# Patient Record
Sex: MALE | Race: WHITE | NOT HISPANIC OR LATINO | URBAN - METROPOLITAN AREA
[De-identification: names, ages, dates, MRNs, and addresses within clinical notes are randomized per-mention and may not be internally consistent; named-entity substitution may affect disease eponyms.]

---

## 2018-06-01 ENCOUNTER — EMERGENCY (EMERGENCY)
Facility: HOSPITAL | Age: 48
LOS: 0 days | Discharge: HOME | End: 2018-06-01
Admitting: PHYSICIAN ASSISTANT

## 2018-06-01 VITALS
DIASTOLIC BLOOD PRESSURE: 81 MMHG | SYSTOLIC BLOOD PRESSURE: 139 MMHG | OXYGEN SATURATION: 96 % | RESPIRATION RATE: 18 BRPM | HEART RATE: 79 BPM

## 2018-06-01 VITALS
HEART RATE: 84 BPM | DIASTOLIC BLOOD PRESSURE: 102 MMHG | RESPIRATION RATE: 18 BRPM | SYSTOLIC BLOOD PRESSURE: 167 MMHG | TEMPERATURE: 99 F | OXYGEN SATURATION: 95 %

## 2018-06-01 DIAGNOSIS — T16.1XXA FOREIGN BODY IN RIGHT EAR, INITIAL ENCOUNTER: ICD-10-CM
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
## 2020-05-07 ENCOUNTER — INPATIENT (INPATIENT)
Facility: HOSPITAL | Age: 50
LOS: 1 days | Discharge: HOME | End: 2020-05-09
Attending: INTERNAL MEDICINE | Admitting: INTERNAL MEDICINE
Payer: COMMERCIAL

## 2020-05-07 VITALS
OXYGEN SATURATION: 96 % | HEART RATE: 107 BPM | TEMPERATURE: 98 F | RESPIRATION RATE: 22 BRPM | SYSTOLIC BLOOD PRESSURE: 182 MMHG | DIASTOLIC BLOOD PRESSURE: 102 MMHG

## 2020-05-07 LAB
ALBUMIN SERPL ELPH-MCNC: 4.7 G/DL — SIGNIFICANT CHANGE UP (ref 3.5–5.2)
ALP SERPL-CCNC: 103 U/L — SIGNIFICANT CHANGE UP (ref 30–115)
ALT FLD-CCNC: 46 U/L — HIGH (ref 0–41)
ANION GAP SERPL CALC-SCNC: 12 MMOL/L — SIGNIFICANT CHANGE UP (ref 7–14)
APPEARANCE UR: CLEAR — SIGNIFICANT CHANGE UP
AST SERPL-CCNC: 28 U/L — SIGNIFICANT CHANGE UP (ref 0–41)
BACTERIA # UR AUTO: NEGATIVE — SIGNIFICANT CHANGE UP
BASOPHILS # BLD AUTO: 0.05 K/UL — SIGNIFICANT CHANGE UP (ref 0–0.2)
BASOPHILS NFR BLD AUTO: 0.5 % — SIGNIFICANT CHANGE UP (ref 0–1)
BILIRUB SERPL-MCNC: 0.3 MG/DL — SIGNIFICANT CHANGE UP (ref 0.2–1.2)
BILIRUB UR-MCNC: NEGATIVE — SIGNIFICANT CHANGE UP
BUN SERPL-MCNC: 19 MG/DL — SIGNIFICANT CHANGE UP (ref 10–20)
CALCIUM SERPL-MCNC: 9.7 MG/DL — SIGNIFICANT CHANGE UP (ref 8.5–10.1)
CHLORIDE SERPL-SCNC: 100 MMOL/L — SIGNIFICANT CHANGE UP (ref 98–110)
CO2 SERPL-SCNC: 26 MMOL/L — SIGNIFICANT CHANGE UP (ref 17–32)
COLOR SPEC: SIGNIFICANT CHANGE UP
CREAT SERPL-MCNC: 0.8 MG/DL — SIGNIFICANT CHANGE UP (ref 0.7–1.5)
DIFF PNL FLD: NEGATIVE — SIGNIFICANT CHANGE UP
EOSINOPHIL # BLD AUTO: 0.36 K/UL — SIGNIFICANT CHANGE UP (ref 0–0.7)
EOSINOPHIL NFR BLD AUTO: 3.3 % — SIGNIFICANT CHANGE UP (ref 0–8)
EPI CELLS # UR: 0 /HPF — SIGNIFICANT CHANGE UP (ref 0–5)
GLUCOSE SERPL-MCNC: 96 MG/DL — SIGNIFICANT CHANGE UP (ref 70–99)
GLUCOSE UR QL: NEGATIVE — SIGNIFICANT CHANGE UP
HCT VFR BLD CALC: 43.7 % — SIGNIFICANT CHANGE UP (ref 42–52)
HGB BLD-MCNC: 15 G/DL — SIGNIFICANT CHANGE UP (ref 14–18)
HYALINE CASTS # UR AUTO: 0 /LPF — SIGNIFICANT CHANGE UP (ref 0–7)
IMM GRANULOCYTES NFR BLD AUTO: 0.4 % — HIGH (ref 0.1–0.3)
KETONES UR-MCNC: NEGATIVE — SIGNIFICANT CHANGE UP
LEUKOCYTE ESTERASE UR-ACNC: NEGATIVE — SIGNIFICANT CHANGE UP
LYMPHOCYTES # BLD AUTO: 2.87 K/UL — SIGNIFICANT CHANGE UP (ref 1.2–3.4)
LYMPHOCYTES # BLD AUTO: 26.6 % — SIGNIFICANT CHANGE UP (ref 20.5–51.1)
MCHC RBC-ENTMCNC: 28.6 PG — SIGNIFICANT CHANGE UP (ref 27–31)
MCHC RBC-ENTMCNC: 34.3 G/DL — SIGNIFICANT CHANGE UP (ref 32–37)
MCV RBC AUTO: 83.4 FL — SIGNIFICANT CHANGE UP (ref 80–94)
MONOCYTES # BLD AUTO: 1 K/UL — HIGH (ref 0.1–0.6)
MONOCYTES NFR BLD AUTO: 9.3 % — SIGNIFICANT CHANGE UP (ref 1.7–9.3)
NEUTROPHILS # BLD AUTO: 6.46 K/UL — SIGNIFICANT CHANGE UP (ref 1.4–6.5)
NEUTROPHILS NFR BLD AUTO: 59.9 % — SIGNIFICANT CHANGE UP (ref 42.2–75.2)
NITRITE UR-MCNC: NEGATIVE — SIGNIFICANT CHANGE UP
NRBC # BLD: 0 /100 WBCS — SIGNIFICANT CHANGE UP (ref 0–0)
NT-PROBNP SERPL-SCNC: 135 PG/ML — SIGNIFICANT CHANGE UP (ref 0–300)
PH UR: 6 — SIGNIFICANT CHANGE UP (ref 5–8)
PLATELET # BLD AUTO: 299 K/UL — SIGNIFICANT CHANGE UP (ref 130–400)
POTASSIUM SERPL-MCNC: 4 MMOL/L — SIGNIFICANT CHANGE UP (ref 3.5–5)
POTASSIUM SERPL-SCNC: 4 MMOL/L — SIGNIFICANT CHANGE UP (ref 3.5–5)
PROT SERPL-MCNC: 8 G/DL — SIGNIFICANT CHANGE UP (ref 6–8)
PROT UR-MCNC: ABNORMAL
RBC # BLD: 5.24 M/UL — SIGNIFICANT CHANGE UP (ref 4.7–6.1)
RBC # FLD: 13.7 % — SIGNIFICANT CHANGE UP (ref 11.5–14.5)
RBC CASTS # UR COMP ASSIST: 1 /HPF — SIGNIFICANT CHANGE UP (ref 0–4)
SODIUM SERPL-SCNC: 138 MMOL/L — SIGNIFICANT CHANGE UP (ref 135–146)
SP GR SPEC: 1.01 — SIGNIFICANT CHANGE UP (ref 1.01–1.02)
TROPONIN T SERPL-MCNC: <0.01 NG/ML — SIGNIFICANT CHANGE UP
UROBILINOGEN FLD QL: SIGNIFICANT CHANGE UP
WBC # BLD: 10.78 K/UL — SIGNIFICANT CHANGE UP (ref 4.8–10.8)
WBC # FLD AUTO: 10.78 K/UL — SIGNIFICANT CHANGE UP (ref 4.8–10.8)
WBC UR QL: 0 /HPF — SIGNIFICANT CHANGE UP (ref 0–5)

## 2020-05-07 PROCEDURE — 76870 US EXAM SCROTUM: CPT | Mod: 26

## 2020-05-07 PROCEDURE — 93970 EXTREMITY STUDY: CPT | Mod: 26

## 2020-05-07 PROCEDURE — 99285 EMERGENCY DEPT VISIT HI MDM: CPT

## 2020-05-07 PROCEDURE — 93010 ELECTROCARDIOGRAM REPORT: CPT

## 2020-05-07 PROCEDURE — 71045 X-RAY EXAM CHEST 1 VIEW: CPT | Mod: 26

## 2020-05-07 PROCEDURE — 99223 1ST HOSP IP/OBS HIGH 75: CPT | Mod: AI,GC

## 2020-05-07 RX ORDER — ENOXAPARIN SODIUM 100 MG/ML
40 INJECTION SUBCUTANEOUS DAILY
Refills: 0 | Status: DISCONTINUED | OUTPATIENT
Start: 2020-05-07 | End: 2020-05-09

## 2020-05-07 RX ORDER — ALBUTEROL 90 UG/1
2 AEROSOL, METERED ORAL EVERY 6 HOURS
Refills: 0 | Status: DISCONTINUED | OUTPATIENT
Start: 2020-05-07 | End: 2020-05-09

## 2020-05-07 RX ORDER — CHLORHEXIDINE GLUCONATE 213 G/1000ML
1 SOLUTION TOPICAL
Refills: 0 | Status: DISCONTINUED | OUTPATIENT
Start: 2020-05-07 | End: 2020-05-09

## 2020-05-07 RX ORDER — BUDESONIDE AND FORMOTEROL FUMARATE DIHYDRATE 160; 4.5 UG/1; UG/1
2 AEROSOL RESPIRATORY (INHALATION)
Refills: 0 | Status: DISCONTINUED | OUTPATIENT
Start: 2020-05-07 | End: 2020-05-09

## 2020-05-07 RX ORDER — FUROSEMIDE 40 MG
40 TABLET ORAL DAILY
Refills: 0 | Status: DISCONTINUED | OUTPATIENT
Start: 2020-05-08 | End: 2020-05-09

## 2020-05-07 RX ORDER — ACETAMINOPHEN 500 MG
650 TABLET ORAL EVERY 6 HOURS
Refills: 0 | Status: DISCONTINUED | OUTPATIENT
Start: 2020-05-07 | End: 2020-05-09

## 2020-05-07 RX ORDER — FUROSEMIDE 40 MG
40 TABLET ORAL ONCE
Refills: 0 | Status: COMPLETED | OUTPATIENT
Start: 2020-05-07 | End: 2020-05-07

## 2020-05-07 RX ORDER — HYDROCHLOROTHIAZIDE 25 MG
25 TABLET ORAL DAILY
Refills: 0 | Status: DISCONTINUED | OUTPATIENT
Start: 2020-05-07 | End: 2020-05-09

## 2020-05-07 RX ADMIN — Medication 40 MILLIGRAM(S): at 21:36

## 2020-05-07 NOTE — ED ADULT NURSE NOTE - OBJECTIVE STATEMENT
Pt presented to ED c/o multiple med complaints. Pt c/o B/L leg edema. Pt noted w/ +3 edema, weeping. + pulses noted. Pt also c/o scrotal swelling. Denies n/v/d/fevers/chills. Pt c/o SOB on exertion, airway intact. Pt A&Ox4, ambulatory.

## 2020-05-07 NOTE — ED PROVIDER NOTE - NS ED ROS FT
CONSTITUTIONAL: (-) fevers, (-) chills, (-) malaise, (-) diaphoresis  EYES: (-) vision changes, (-) blurry vision, (-) double vision, (-) photophobia, (-) eye pain, (-) eye redness, (-) eye discharge, (-) tearing  CARDIO: (-) chest pain, (-) palpitations, (+) b/l lower extremity edema  PULM: (-) cough, (-) sputum, (-) chest tightness, (+) shortness of breath, (-) wheezing  GI: (-) nausea, (-) vomiting, (-) diarrhea, (-) abdominal pain  : (-) dysuria, (-) hematuria, (-) frequency, (-) difficulty urinating, (-) urinary retention, (-) flank pain, (-) penile discharge  MSK: (-) back pain, (-) myalgias, (-) gait difficulty  SKIN: (-) rashes, (-) pallor, (-) itching, (-) wounds  NEURO: (-) dizziness, (-) lightheadedness, (-) weakness, (-) paresthesias, (-) numbness, (-) syncope    *all other systems negative except as documented above and in the HPI*

## 2020-05-07 NOTE — ED ADULT NURSE NOTE - NSIMPLEMENTINTERV_GEN_ALL_ED
Implemented All Universal Safety Interventions:  [place] to call system. Call bell, personal items and telephone within reach. Instruct patient to call for assistance. Room bathroom lighting operational. Non-slip footwear when patient is off stretcher. Physically safe environment: no spills, clutter or unnecessary equipment. Stretcher in lowest position, wheels locked, appropriate side rails in place.

## 2020-05-07 NOTE — H&P ADULT - HISTORY OF PRESENT ILLNESS
50y/o M with PMH of HTN, pre-DM, asthma presents to ED from Dr. Blake office w/ inc MOSQUERA and LE edema. Positive COVID 4/4 with mild sxs of fever/cough for 2wks after. Then 5d ago pt had inc scrotal swelling and leg swelling with some erythema. Today pt had inc MOSQUERA with mild SOB so called his cardiologist who sent him to ED. Otherwise denies CP, palpitations, fever/chills, cough, syncope, abd pain, NVD, urinary sxs, penile discharge/lesions. As per pt had echo/nuclear stress 3mo ago which were WNL.     In ED: T98.5, HR 94, /91, RR18, SpO2 97% on RA. Labs all WNL, trop neg, .   CXR b/l interstitial infiltrates R base >L. US testicles showed inc scrotal wall edema w/ R>L hydrocele.   Admit for cardio workup. COVID resent. 48y/o M with PMH of HTN, pre-DM, asthma presents to ED from Dr. Blake office w/ inc MOSQUERA and LE edema. Pt developed fever/cough/malaise 3/26, tested pos COVID 4/4 for 2wks after. Recovered by ~4/14. Then 5d ago pt had inc scrotal swelling and inc leg swelling with some erythema. Had mild tenderness in R side. Today pt had inc MOSQUERA with mild SOB so called his cardiologist who sent him to ED. Otherwise denies CP, palpitations, fever/chills, cough, syncope, abd pain, NVD, urinary sxs, penile discharge/lesions. As per pt had echo/nuclear stress 3mo ago which were WNL.     In ED: T98.5, HR 94, /91, RR18, SpO2 97% on RA. Labs all WNL, trop neg, .   CXR b/l interstitial infiltrates R base >L. US testicles showed inc scrotal wall edema w/ R>L hydrocele.   Admit for cardio workup. COVID resent.

## 2020-05-07 NOTE — ED PROVIDER NOTE - PROGRESS NOTE DETAILS
IRASEMA: per vascular tech, prelim duplex negative for DVT. Attending Note: I personally evaluated the patient. I reviewed the Resident’s or Physician Assistant’s note (as assigned above), and agree with the findings and plan except as documented in my note.   48 yo M PMH HTN, pre DM, asthma, had COVID on 04/04/2020 here for eval. Pt states on Sunday he developed scrotum swelling, Monday had leg swelling and last night SOB. Reports he wasn’t able to sleep at night due to SOB, had to get up multiple times. States sx get worse with ambulation. No CP, fever, chills, abd pain, n/v/d, constipation, or urinary sx. Pt called his cardiologist who prescribed him Hydrochlorothiazide 25 mg but referred him to ED for further eval. On exam: Pt in NAD, AAOX3. Head NCAT. CN II-XII intact. Lungs CTABL. CV S1S2 regular. Abdomen soft NTND, (+) BS. : (+) Scrotal edema. Extremities (+) 3+ pitting edema in LE. Motor 5/5 x4, sensation intact. Plan: Labs, CXR, EKG, and reeval. IRASEMA: patient resting comfortably, denies shortness of breath at this time. discussed results, recommendation to admit to EDOU for echo, repeat trop. patient agreeable with plan, PA Fearns aware. IRASEMA: patient resting comfortably, denies shortness of breath at this time. discussed results, recommendation to admit for further evaluation. Call with Dr. Presley, requesting admission for echo, 40 mg IV lasix. Attending Note: I personally evaluated the patient. I reviewed the Resident’s or Physician Assistant’s note (as assigned above), and agree with the findings and plan except as documented in my note.   49 y.o. M, PMH HTN, pre DM, asthma, had COVID on 04/04/2020 here for eval. Pt states on Sunday he developed scrotum swelling, Monday had leg swelling and last night SOB. Reports he wasn’t able to sleep at night due to SOB, had to get up multiple times. States SOB gets worse with ambulation. No CP, fever, chills, abd pain, n/v/d, constipation, or urinary sx. Pt called his cardiologist who prescribed him Hydrochlorothiazide 25 mg but referred him to ED for further eval. On exam: Pt in NAD, AAOX3. Head NCAT. CN II-XII intact. Lungs CTABL. CV S1S2 regular. Abdomen soft NTND, (+) BS. : (+) Scrotal edema. Extremities (+) 3+ pitting edema in LE. Motor 5/5 x4, sensation intact. Plan: Labs, CXR, EKG, and reeval.

## 2020-05-07 NOTE — ED PROVIDER NOTE - CLINICAL SUMMARY MEDICAL DECISION MAKING FREE TEXT BOX
49 y.o. M, PMH HTN, pre DM, asthma, had COVID on 04/04/2020 here for eval. Pt states on Sunday he developed scrotum swelling, Monday had leg swelling and last night SOB. Reports he wasn’t able to sleep at night due to SOB, had to get up multiple times. States SOB gets worse with ambulation. No CP, fever, chills, abd pain, n/v/d, constipation, or urinary sx. Pt called his cardiologist who prescribed him Hydrochlorothiazide 25 mg but referred him to ED for further eval. On exam: Pt in NAD, AAOX3. Head NCAT. CN II-XII intact. Lungs CTABL. CV S1S2 regular. Abdomen soft NTND, (+) BS. : (+) Scrotal edema. Extremities (+) 3+ pitting edema in LE. Motor 5/5 x4, sensation intact. Will give Lasix and admit for further work up.

## 2020-05-07 NOTE — H&P ADULT - NSHPLABSRESULTS_GEN_ALL_CORE
< from: US Testicles (05.07.20 @ 20:05) >    Prominent vascularity to both testicles - No evidence for testicular torsion.    Questionable slight increased vascularity to the right epididymis and testicle could reflect epididymoorchitis in the appropriate clinical setting.    Right greater than left hydroceles.    Diffuse scrotal wall edema with increased vascularity.    Bilateral fat-containing inguinal hernias.    < end of copied text >

## 2020-05-07 NOTE — H&P ADULT - ATTENDING COMMENTS
HPI:  48y/o M with PMH of HTN, pre-DM, asthma presents to ED from Dr. Blake office w/ inc MOSQUERA and LE edema. Pt developed fever/cough/malaise 3/26, tested pos COVID 4/4 for 2wks after. Recovered by ~4/14. Then 5d ago pt had inc scrotal swelling and inc leg swelling with some erythema. Had mild tenderness in R side. Today pt had inc MOSQUERA with mild SOB so called his cardiologist who sent him to ED. Otherwise denies CP, palpitations, fever/chills, cough, syncope, abd pain, NVD, urinary sxs, penile discharge/lesions. As per pt had echo/nuclear stress 3mo ago which were WNL.     In ED: T98.5, HR 94, /91, RR18, SpO2 97% on RA. Labs all WNL, trop neg, .   CXR b/l interstitial infiltrates R base >L. US testicles showed inc scrotal wall edema w/ R>L hydrocele.   Admit for cardio workup. COVMICHELLE adan. (07 May 2020 22:13)    REVIEW OF SYSTEMS: see cc/HPI   CONSTITUTIONAL: No weakness, fevers or chills  EYES/ENT: No visual changes;  No vertigo or throat pain   NECK: No pain or stiffness  RESPIRATORY: No cough, wheezing, hemoptysis; (+) shortness of breath  CARDIOVASCULAR: No chest pain or palpitations  GASTROINTESTINAL: No abdominal or epigastric pain. No nausea, vomiting, or hematemesis; No diarrhea or constipation. No melena or hematochezia.  GENITOURINARY: No dysuria, frequency or hematuria, (+) scrotal edema   NEUROLOGICAL: No numbness or weakness  SKIN: No itching, rashes    Physical Exam:  General: WN/WD NAD  Neurology: A&Ox3, nonfocal, follows commands  Eyes: PERRLA/ EOMI  ENT/Neck: Neck supple, trachea midline, No JVD  Respiratory: CTA B/L, No wheezing, rales, rhonchi  CV: Normal rate regular rhythm, S1S2, no murmurs, rubs or gallops  Abdominal: Soft, NT, ND +BS,   Extremities: (+) edema, + peripheral pulses  Skin: No Rashes, Hematoma, Ecchymosis  Incisions:   Tubes: HPI:  50y/o M with PMH of HTN, pre-DM, asthma presents to ED from Dr. Blake office w/ inc MOSQUERA and LE edema. Pt developed fever/cough/malaise 3/26, tested pos COVID 4/4 for 2wks after. Recovered by ~4/14. Then 5d ago pt had inc scrotal swelling and inc leg swelling with some erythema. Had mild tenderness in R side. Today pt had inc MOSQUERA with mild SOB so called his cardiologist who sent him to ED. Otherwise denies CP, palpitations, fever/chills, cough, syncope, abd pain, NVD, urinary sxs, penile discharge/lesions. As per pt had echo/nuclear stress 3mo ago which were WNL.     In ED: T98.5, HR 94, /91, RR18, SpO2 97% on RA. Labs all WNL, trop neg, .   CXR b/l interstitial infiltrates R base >L. US testicles showed inc scrotal wall edema w/ R>L hydrocele.   Admit for cardio workup. COVID resent. (07 May 2020 22:13)    REVIEW OF SYSTEMS: see cc/HPI   CONSTITUTIONAL: No weakness, fevers or chills  EYES/ENT: No visual changes;  No vertigo or throat pain   NECK: No pain or stiffness  RESPIRATORY: No cough, wheezing, hemoptysis; (+) shortness of breath  CARDIOVASCULAR: No chest pain or palpitations  GASTROINTESTINAL: No abdominal or epigastric pain. No nausea, vomiting, or hematemesis; No diarrhea or constipation. No melena or hematochezia.  GENITOURINARY: No dysuria, frequency or hematuria, (+) scrotal edema   NEUROLOGICAL: No numbness or weakness  SKIN: No itching, rashes    Physical Exam:  General: WN/WD NAD  Neurology: A&Ox3, nonfocal, follows commands  Eyes: PERRLA/ EOMI  ENT/Neck: Neck supple, trachea midline, No JVD  Respiratory: CTA B/L, No wheezing, rales, rhonchi  CV: Normal rate regular rhythm, S1S2, no murmurs, rubs or gallops  Abdominal: Soft, NT, ND +BS,   Extremities: (+) edema, + peripheral pulses  Skin: No Rashes, Hematoma, Ecchymosis  Incisions:   Tubes:    A/P  Dyspnea/fluid overload - multifactorial COVID-19 Pneumonia / HF / Asthma   Acute HF?EF r/o Cardiomyopathy   -IV lasix / Is and Os / daily weights  -2D echo   -Cardiology eval   -f/u report of venous duplex LEs   -PRN Albuterol and Symbicort    HTN   -hold HCTZ ( on IV Lasix ) and Amlodipine     DVT prophylaxis

## 2020-05-07 NOTE — H&P ADULT - NSICDXFAMILYHX_GEN_ALL_CORE_FT
FAMILY HISTORY:  Family history of cardiomyopathy, father  at 66  FH: CABG (coronary artery bypass surgery), father  at 66

## 2020-05-07 NOTE — H&P ADULT - NSICDXPASTMEDICALHX_GEN_ALL_CORE_FT
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

## 2020-05-07 NOTE — ED PROVIDER NOTE - PHYSICAL EXAMINATION
VITALS:  I have reviewed the initial vital signs.  GENERAL: Well-developed, well-nourished, in no acute distress. Nontoxic.  HEENT: Sclera clear. EOMI, PERRLA. Mucous membranes moist.  NECK: supple w FROM. No JVD.  CARDIO: RRR, nl S1 and S2. No murmurs, rubs, or gallops. 2+ radial and pedal pulses bilaterally.  PULM: Normal effort. No tachypnea or retractions. No stridor. CTA b/l without wheezes, rales, or rhonchi.  EXTREMITIES: Normal, steady gait. 2+ pitting edema to b/l lower extremities up to mid-calf. no warmth, induration, or streaking. +right calf ttp.  GI: Abdomen soft and non-distended. Nontender.  : exam chaperoned by QAMAR Medel - normal male genitalia. +diffuse scrotal swelling. no ttp. no palpable masses. no inguinal lymphadenopathy. no rash or penile dc.  SKIN: Warm, dry.   NEURO: A&Ox3. Speech clear. No gross motor/sensory deficits.

## 2020-05-07 NOTE — H&P ADULT - ASSESSMENT
48y/o M with HTN, asthma, known to be COVID+ve presents with signs of possible HF but neg BNP.    #MOSQUERA, LE swelling, SOB  - DDX: COVID PNA vs. CHF vs. less likely PNA  - CXR b/l opacities R>L  - , Trop neg, not hypoxemic, no lymphopenia/transaminitis/JUAN  - no signs of sepsis or bacterial infection  - 2D echo  - f/u repeat COVID19  - f/u procal, CRP, ferritin, D-dimer  - ID consult if markers elevated  - Cardio consult    #HTN-   #pre-DM- diet controlled  #Asthma- c/w inhalers PRN    #MISC  - DVT ppx: lovenox  - GI ppx: not indicated  - Activity: as tolerated  - Diet: DASH, CC 48y/o M with HTN, asthma, known to be COVID+ve presents with signs of possible HF but neg BNP.    #MOSQUERA, LE swelling, SOB  - DDX: COVID PNA vs. CHF vs. less likely PNA vs. worsened by amlodipine?  - CXR b/l opacities R>L  - , Trop neg, not hypoxemic, no lymphopenia/transaminitis/JUAN  - no signs of sepsis or bacterial infection  - f/u repeat COVID19  - f/u procal, CRP, ferritin, D-dimer  - f/u duplex  - hold amlodipine  - c/w lasix 40 qd  - 2D echo  - Cardio consult    #HTN- c/w HCTZ, hold amlodipine   #Congenital absence of kidney- use care w/ nephrotoxins  #pre-DM- diet controlled  #Asthma- c/w inhalers PRN & symbicort    #MISC  - DVT ppx: lovenox  - GI ppx: not indicated  - Activity: as tolerated  - Diet: DASH, CC

## 2020-05-07 NOTE — H&P ADULT - NSHPPHYSICALEXAM_GEN_ALL_CORE
GENERAL: NAD, well-developed  HEENT:  Atraumatic, Normocephalic; EOMI, PERRLA, conjunctiva pink, sclera white, Supple, No JVD, thyromegally or LYAD appreciated  CHEST/LUNG: Clear to auscultation bilaterally; No wheeze, ronchi or rales  HEART: Regular rate and rhythm; No murmurs, rubs, or gallops  ABDOMEN: Soft, Nontender, Nondistended; Bowel sounds present  EXTREMITIES:  2+ Peripheral Pulses, No peripheral pitting edema  PSYCH: AAOx3  NEUROLOGY: non-focal  SKIN: No rashes/lesions appreciated GENERAL: NAD, obese well-developed M  HEENT:  Atraumatic, Normocephalic; EOMI, PERRLA, conjunctiva pink, sclera white, Supple, No JVD   CHEST/LUNG: soft crackle in R base  HEART: Regular rate and rhythm; No murmurs, distant heart sounds  ABDOMEN: Soft, Nontender, Nondistended; Bowel sounds present  	: L inguinal hernia, scrotum diffusely swollen  EXTREMITIES:  2+ Peripheral Pulses, 3+ pitting edema to below knee with some overlying erythema, no asymmetry   PSYCH: AAOx3  NEUROLOGY: non-focal

## 2020-05-07 NOTE — ED PROVIDER NOTE - OBJECTIVE STATEMENT
49 year old male w hx of HTN, pre-DM, asthma presents to the ED for evaluation of gradual onset, mild, shortness of breath x 1 day. Pt states he tested positive for COVID 19 on 4/4, has been asymptomatic for two weeks now (only had cough and fevers, never hospitalized). 5 days ago patient woke up and noticed scrotal swelling, the following day he noticed b/l leg swelling and erythema. Today patient developed shortness of breath, called his cardiologist who recommended he come here for evaluation. Pt denies fevers/chills, cough, chest pain, palpitations, syncope, abd pain, n/v, irritative voiding symptoms, penile dc, gait difficulty. Pt had nuclear stress test and echo 3 months ago, normal per patient.

## 2020-05-07 NOTE — ED ADULT TRIAGE NOTE - CHIEF COMPLAINT QUOTE
patient complaint of shortness of breath, bilateral leg swelling, scrotal swelling.  patient tested positive for covid 4/4 denies any recent fevers.

## 2020-05-08 LAB
ANION GAP SERPL CALC-SCNC: 13 MMOL/L — SIGNIFICANT CHANGE UP (ref 7–14)
BASOPHILS # BLD AUTO: 0.05 K/UL — SIGNIFICANT CHANGE UP (ref 0–0.2)
BASOPHILS NFR BLD AUTO: 0.5 % — SIGNIFICANT CHANGE UP (ref 0–1)
BUN SERPL-MCNC: 17 MG/DL — SIGNIFICANT CHANGE UP (ref 10–20)
CALCIUM SERPL-MCNC: 9.7 MG/DL — SIGNIFICANT CHANGE UP (ref 8.5–10.1)
CHLORIDE SERPL-SCNC: 98 MMOL/L — SIGNIFICANT CHANGE UP (ref 98–110)
CO2 SERPL-SCNC: 27 MMOL/L — SIGNIFICANT CHANGE UP (ref 17–32)
CREAT SERPL-MCNC: 0.8 MG/DL — SIGNIFICANT CHANGE UP (ref 0.7–1.5)
CRP SERPL-MCNC: 0.23 MG/DL — SIGNIFICANT CHANGE UP (ref 0–0.4)
D DIMER BLD IA.RAPID-MCNC: 146 NG/ML DDU — SIGNIFICANT CHANGE UP (ref 0–230)
EOSINOPHIL # BLD AUTO: 0.36 K/UL — SIGNIFICANT CHANGE UP (ref 0–0.7)
EOSINOPHIL NFR BLD AUTO: 3.9 % — SIGNIFICANT CHANGE UP (ref 0–8)
FERRITIN SERPL-MCNC: 230 NG/ML — SIGNIFICANT CHANGE UP (ref 30–400)
GLUCOSE SERPL-MCNC: 110 MG/DL — HIGH (ref 70–99)
HCT VFR BLD CALC: 44.2 % — SIGNIFICANT CHANGE UP (ref 42–52)
HGB BLD-MCNC: 14.8 G/DL — SIGNIFICANT CHANGE UP (ref 14–18)
IMM GRANULOCYTES NFR BLD AUTO: 0.5 % — HIGH (ref 0.1–0.3)
LYMPHOCYTES # BLD AUTO: 2.26 K/UL — SIGNIFICANT CHANGE UP (ref 1.2–3.4)
LYMPHOCYTES # BLD AUTO: 24.5 % — SIGNIFICANT CHANGE UP (ref 20.5–51.1)
MAGNESIUM SERPL-MCNC: 2.2 MG/DL — SIGNIFICANT CHANGE UP (ref 1.8–2.4)
MCHC RBC-ENTMCNC: 28.5 PG — SIGNIFICANT CHANGE UP (ref 27–31)
MCHC RBC-ENTMCNC: 33.5 G/DL — SIGNIFICANT CHANGE UP (ref 32–37)
MCV RBC AUTO: 85.2 FL — SIGNIFICANT CHANGE UP (ref 80–94)
MONOCYTES # BLD AUTO: 0.9 K/UL — HIGH (ref 0.1–0.6)
MONOCYTES NFR BLD AUTO: 9.8 % — HIGH (ref 1.7–9.3)
NEUTROPHILS # BLD AUTO: 5.59 K/UL — SIGNIFICANT CHANGE UP (ref 1.4–6.5)
NEUTROPHILS NFR BLD AUTO: 60.8 % — SIGNIFICANT CHANGE UP (ref 42.2–75.2)
NRBC # BLD: 0 /100 WBCS — SIGNIFICANT CHANGE UP (ref 0–0)
PLATELET # BLD AUTO: 271 K/UL — SIGNIFICANT CHANGE UP (ref 130–400)
POTASSIUM SERPL-MCNC: 3.9 MMOL/L — SIGNIFICANT CHANGE UP (ref 3.5–5)
POTASSIUM SERPL-SCNC: 3.9 MMOL/L — SIGNIFICANT CHANGE UP (ref 3.5–5)
PROCALCITONIN SERPL-MCNC: 0.06 NG/ML — SIGNIFICANT CHANGE UP (ref 0.02–0.1)
RBC # BLD: 5.19 M/UL — SIGNIFICANT CHANGE UP (ref 4.7–6.1)
RBC # FLD: 13.8 % — SIGNIFICANT CHANGE UP (ref 11.5–14.5)
SARS-COV-2 RNA SPEC QL NAA+PROBE: SIGNIFICANT CHANGE UP
SODIUM SERPL-SCNC: 138 MMOL/L — SIGNIFICANT CHANGE UP (ref 135–146)
TROPONIN T SERPL-MCNC: <0.01 NG/ML — SIGNIFICANT CHANGE UP
WBC # BLD: 9.21 K/UL — SIGNIFICANT CHANGE UP (ref 4.8–10.8)
WBC # FLD AUTO: 9.21 K/UL — SIGNIFICANT CHANGE UP (ref 4.8–10.8)

## 2020-05-08 PROCEDURE — 93306 TTE W/DOPPLER COMPLETE: CPT | Mod: 26

## 2020-05-08 PROCEDURE — 71045 X-RAY EXAM CHEST 1 VIEW: CPT | Mod: 26

## 2020-05-08 PROCEDURE — 99223 1ST HOSP IP/OBS HIGH 75: CPT

## 2020-05-08 RX ADMIN — ENOXAPARIN SODIUM 40 MILLIGRAM(S): 100 INJECTION SUBCUTANEOUS at 11:08

## 2020-05-08 RX ADMIN — Medication 25 MILLIGRAM(S): at 06:58

## 2020-05-08 RX ADMIN — BUDESONIDE AND FORMOTEROL FUMARATE DIHYDRATE 2 PUFF(S): 160; 4.5 AEROSOL RESPIRATORY (INHALATION) at 08:20

## 2020-05-08 RX ADMIN — Medication 40 MILLIGRAM(S): at 06:30

## 2020-05-08 NOTE — PROGRESS NOTE ADULT - SUBJECTIVE AND OBJECTIVE BOX
SUBJECTIVE:    Patient is a 49y old Male who presents with a chief complaint of SOB, MOSQUERA (07 May 2020 22:13)    Currently admitted to medicine with the primary diagnosis of Leg swelling     Today is hospital day 1d. This morning he is resting comfortably in bed and reports no new issues or overnight events.     PAST MEDICAL & SURGICAL HISTORY  Congenital absence of kidney: only has 1 kidney  Asthma  Pre-diabetes  Hypertension  No significant past surgical history    SOCIAL HISTORY:  Negative for smoking/alcohol/drug use.     ALLERGIES:  No Known Allergies    MEDICATIONS:  STANDING MEDICATIONS  budesonide  80 MICROgram(s)/formoterol 4.5 MICROgram(s) Inhaler 2 Puff(s) Inhalation two times a day  chlorhexidine 4% Liquid 1 Application(s) Topical <User Schedule>  enoxaparin Injectable 40 milliGRAM(s) SubCutaneous daily  furosemide   Injectable 40 milliGRAM(s) IV Push daily  hydrochlorothiazide 25 milliGRAM(s) Oral daily    PRN MEDICATIONS  acetaminophen   Tablet .. 650 milliGRAM(s) Oral every 6 hours PRN  ALBUTerol    90 MICROgram(s) HFA Inhaler 2 Puff(s) Inhalation every 6 hours PRN    VITALS:   T(F): 97.5  HR: 78  BP: 138/90  RR: 18  SpO2: 98%    LABS:                        14.8   9.21  )-----------( 271      ( 08 May 2020 06:17 )             44.2     05-08    138  |  98  |  17  ----------------------------<  110<H>  3.9   |  27  |  0.8    Ca    9.7      08 May 2020 06:17  Mg     2.2     05-08    TPro  8.0  /  Alb  4.7  /  TBili  0.3  /  DBili  x   /  AST  28  /  ALT  46<H>  /  AlkPhos  103  05-07      Urinalysis Basic - ( 07 May 2020 20:04 )    Color: Light Yellow / Appearance: Clear / S.014 / pH: x  Gluc: x / Ketone: Negative  / Bili: Negative / Urobili: <2 mg/dL   Blood: x / Protein: 30 mg/dL / Nitrite: Negative   Leuk Esterase: Negative / RBC: 1 /HPF / WBC 0 /HPF   Sq Epi: x / Non Sq Epi: 0 /HPF / Bacteria: Negative        Troponin T, Serum: <0.01 ng/mL (20 @ 06:17)  Troponin T, Serum: <0.01 ng/mL (20 @ 18:22)      CARDIAC MARKERS ( 08 May 2020 06:17 )  x     / <0.01 ng/mL / x     / x     / x      CARDIAC MARKERS ( 07 May 2020 18:22 )  x     / <0.01 ng/mL / x     / x     / x          RADIOLOGY:  < from: Xray Chest 1 View- PORTABLE-Routine (20 @ 06:48) >    Impression:  Diminishing basilar opacities (likely accentuated by lordotic projection with earlier study)    Prominent pulmonary vascular markings with left midlung field reticular density consistent with scarring or discoid atelectasis.    No consolidation, effusion or pneumothorax.    < end of copied text >  < from: US Testicles (20 @ 20:05) >  IMPRESSION:    Prominent vascularity to both testicles - No evidence for testicular torsion.    Questionable slight increased vascularity to the right epididymis and testicle could reflect epididymoorchitis in the appropriate clinical setting.    Right greater than left hydroceles.    Diffuse scrotal wall edema with increased vascularity.    Bilateral fat-containing inguinal hernias.    < end of copied text >  < from: VA Duplex Lower Ext Vein Scan, Bilat (20 @ 19:03) >  Impression:    No evidence of deep venous thrombosis or superficial thrombophlebitis in the bilateral lower extremities.    < end of copied text >        PHYSICAL EXAM:  GEN: No acute distress  LUNGS: Clear to auscultation bilaterally   HEART: S1/S2 present. RRR.   ABD: Soft, non-tender, non-distended. Bowel sounds present  EXT: NC/NC/NE/2+PP/OCONNOR  NEURO: AAOX3

## 2020-05-08 NOTE — CONSULT NOTE ADULT - SUBJECTIVE AND OBJECTIVE BOX
T H I S    I S    A N    I N C O M P L E T E     N O T MADHAVI CANDELARIA  MRN-[id number]    HISTORY OF PRESENT ILLNESS:  HPI:  50y/o M with PMH of HTN, pre-DM, asthma presents to ED from Dr. Blake office w/ inc MOSQUERA and LE edema. Pt developed fever/cough/malaise 3/26, tested pos COVID  for 2wks after. Recovered by ~. Then 5d ago pt had inc scrotal swelling and inc leg swelling with some erythema. Had mild tenderness in R side. Today pt had inc MOSQUERA with mild SOB so called his cardiologist who sent him to ED. Otherwise denies CP, palpitations, fever/chills, cough, syncope, abd pain, NVD, urinary sxs, penile discharge/lesions. As per pt had echo/nuclear stress 3mo ago which were WNL.     In ED: T98.5, HR 94, /91, RR18, SpO2 97% on RA. Labs all WNL, trop neg, .   CXR b/l interstitial infiltrates R base >L. US testicles showed inc scrotal wall edema w/ R>L hydrocele.   Admit for cardio workup. COVID resent. 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       HOME MEDICATIONS:  amLODIPine 10 mg oral tablet  hydroCHLOROthiazide 25 mg oral tablet    MEDICATIONS:  MEDICATIONS  (STANDING):  budesonide  80 MICROgram(s)/formoterol 4.5 MICROgram(s) Inhaler 2 Puff(s) Inhalation two times a day  chlorhexidine 4% Liquid 1 Application(s) Topical <User Schedule>  enoxaparin Injectable 40 milliGRAM(s) SubCutaneous daily  furosemide   Injectable 40 milliGRAM(s) IV Push daily  hydrochlorothiazide 25 milliGRAM(s) Oral daily    MEDICATIONS  (PRN):  acetaminophen   Tablet .. 650 milliGRAM(s) Oral every 6 hours PRN Temp greater or equal to 38C (100.4F)  ALBUTerol    90 MICROgram(s) HFA Inhaler 2 Puff(s) Inhalation every 6 hours PRN Shortness of Breath and/or Wheezing        VITALS:   Vital Signs Last 24 Hrs  T(C): 35.6 (08 May 2020 09:13), Max: 37.1 (07 May 2020 23:18)  T(F): 96.1 (08 May 2020 09:13), Max: 98.7 (07 May 2020 23:18)  HR: 87 (08 May 2020 09:13) (78 - 107)  BP: 141/83 (08 May 2020 09:13) (134/104 - 182/102)  BP(mean): --  RR: 18 (08 May 2020 09:13) (18 - 22)  SpO2: 99% (08 May 2020 09:13) (96% - 99%)  182.9  135.4  40.5      PHYSICAL EXAM:    GENERAL: NAD  HEAD:  Atraumatic, Normocephalic  NECK: Supple, No JVD  CHEST/LUNG: Clear to auscultation bilaterally;   HEART: Regular rate and rhythm; No murmurs  ABDOMEN: Soft, Nontender, Nondistended  EXTREMITIES:  Good peripheral Pulses, No clubbing, cyanosis, or edema      LABS:                        14.8   9.21  )-----------( 271      ( 08 May 2020 06:17 )             44.2     05-08    138  |  98  |  17  ----------------------------<  110<H>  3.9   |  27  |  0.8    Ca    9.7      08 May 2020 06:17  Mg     2.2     05-08    TPro  8.0  /  Alb  4.7  /  TBili  0.3  /  DBili  x   /  AST  28  /  ALT  46<H>  /  AlkPhos  103  05-07    LIVER FUNCTIONS - ( 07 May 2020 18:22 )  Alb: 4.7 g/dL / Pro: 8.0 g/dL / ALK PHOS: 103 U/L / ALT: 46 U/L / AST: 28 U/L / GGT: x           CARDIAC MARKERS ( 08 May 2020 06:17 )  x     / <0.01 ng/mL / x     / x     / x      CARDIAC MARKERS ( 07 May 2020 18:22 )  x     / <0.01 ng/mL / x     / x     / x            D-Dimer Assay, Quantitative: 146 ng/mL DDU (20 @ 06:17)  Procalcitonin, Serum: 0.06 ng/mL (20 @ 22:24)      Urinalysis Basic - ( 07 May 2020 20:04 )    Color: Light Yellow / Appearance: Clear / S.014 / pH: x  Gluc: x / Ketone: Negative  / Bili: Negative / Urobili: <2 mg/dL   Blood: x / Protein: 30 mg/dL / Nitrite: Negative   Leuk Esterase: Negative / RBC: 1 /HPF / WBC 0 /HPF   Sq Epi: x / Non Sq Epi: 0 /HPF / Bacteria: Negative          ABG & VENT SETTINGS (when applicable)        DIAGNOSTIC STUDIES:  12 Lead ECG:   Ventricular Rate 93 BPM    Atrial Rate 93 BPM    P-R Interval 178 ms    QRS Duration 92 ms    Q-T Interval 344 ms    QTC Calculation(Bezet) 427 ms    P Axis 48 degrees    R Axis 10 degrees    T Axis 54 degrees    Diagnosis Line Normal sinus rhythm  Possible Left atrial enlargement  Borderline ECG    Confirmed by SACHA BRIDGES, SEBASTIAN ([id number]) on 2020 5:10:16 AM (20 @ 17:39) MADHAVI SHEPHERD  MRN-[id number]    HISTORY OF PRESENT ILLNESS:  HPI:  48y/o M with PMH of HTN, pre-DM, asthma presents to ED from Dr. Blake office w/ inc MOSQUERA and LE edema. Pt developed fever/cough/malaise 3/26, tested pos COVID  for 2wks after. Recovered by ~. Then 5d ago pt had inc scrotal swelling and inc leg swelling with some erythema. Had mild tenderness in R side. Today pt had inc MOSQUERA with mild SOB so called his cardiologist who sent him to ED. Otherwise denies CP, palpitations, fever/chills, cough, syncope, abd pain, NVD, urinary sxs, penile discharge/lesions. As per pt had echo/nuclear stress 3mo ago which were WNL.     In ED: T98.5, HR 94, /91, RR18, SpO2 97% on RA. Labs all WNL, trop neg, .   CXR b/l interstitial infiltrates R base >L. US testicles showed inc scrotal wall edema w/ R>L hydrocele.   Admit for cardio workup. COVID resent. 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       HOME MEDICATIONS:  amLODIPine 10 mg oral tablet  hydroCHLOROthiazide 25 mg oral tablet    MEDICATIONS:  MEDICATIONS  (STANDING):  budesonide  80 MICROgram(s)/formoterol 4.5 MICROgram(s) Inhaler 2 Puff(s) Inhalation two times a day  chlorhexidine 4% Liquid 1 Application(s) Topical <User Schedule>  enoxaparin Injectable 40 milliGRAM(s) SubCutaneous daily  furosemide   Injectable 40 milliGRAM(s) IV Push daily  hydrochlorothiazide 25 milliGRAM(s) Oral daily    MEDICATIONS  (PRN):  acetaminophen   Tablet .. 650 milliGRAM(s) Oral every 6 hours PRN Temp greater or equal to 38C (100.4F)  ALBUTerol    90 MICROgram(s) HFA Inhaler 2 Puff(s) Inhalation every 6 hours PRN Shortness of Breath and/or Wheezing        VITALS:   Vital Signs Last 24 Hrs  T(C): 35.6 (08 May 2020 09:13), Max: 37.1 (07 May 2020 23:18)  T(F): 96.1 (08 May 2020 09:13), Max: 98.7 (07 May 2020 23:18)  HR: 87 (08 May 2020 09:13) (78 - 107)  BP: 141/83 (08 May 2020 09:13) (134/104 - 182/102)  BP(mean): --  RR: 18 (08 May 2020 09:13) (18 - 22)  SpO2: 99% (08 May 2020 09:13) (96% - 99%)  182.9  135.4  40.5      PHYSICAL EXAM:    GENERAL: NAD  HEAD:  Atraumatic, Normocephalic  NECK: Supple, No JVD  CHEST/LUNG: bibasilar;   HEART: Regular rate and rhythm; No murmurs  ABDOMEN: Soft, Nontender, Nondistended  EXTREMITIES:  Good peripheral Pulses, No clubbing, cyanosis, or edema      LABS:                        14.8   9.21  )-----------( 271      ( 08 May 2020 06:17 )             44.2     05-08    138  |  98  |  17  ----------------------------<  110<H>  3.9   |  27  |  0.8    Ca    9.7      08 May 2020 06:17  Mg     2.2     05-08    TPro  8.0  /  Alb  4.7  /  TBili  0.3  /  DBili  x   /  AST  28  /  ALT  46<H>  /  AlkPhos  103  05-07    LIVER FUNCTIONS - ( 07 May 2020 18:22 )  Alb: 4.7 g/dL / Pro: 8.0 g/dL / ALK PHOS: 103 U/L / ALT: 46 U/L / AST: 28 U/L / GGT: x           CARDIAC MARKERS ( 08 May 2020 06:17 )  x     / <0.01 ng/mL / x     / x     / x      CARDIAC MARKERS ( 07 May 2020 18:22 )  x     / <0.01 ng/mL / x     / x     / x            D-Dimer Assay, Quantitative: 146 ng/mL DDU (20 @ 06:17)  Procalcitonin, Serum: 0.06 ng/mL (20 @ 22:24)      Urinalysis Basic - ( 07 May 2020 20:04 )    Color: Light Yellow / Appearance: Clear / S.014 / pH: x  Gluc: x / Ketone: Negative  / Bili: Negative / Urobili: <2 mg/dL   Blood: x / Protein: 30 mg/dL / Nitrite: Negative   Leuk Esterase: Negative / RBC: 1 /HPF / WBC 0 /HPF   Sq Epi: x / Non Sq Epi: 0 /HPF / Bacteria: Negative          ABG & VENT SETTINGS (when applicable)        DIAGNOSTIC STUDIES:  12 Lead ECG:   Ventricular Rate 93 BPM    Atrial Rate 93 BPM    P-R Interval 178 ms    QRS Duration 92 ms    Q-T Interval 344 ms    QTC Calculation(Bezet) 427 ms    P Axis 48 degrees    R Axis 10 degrees    T Axis 54 degrees    Diagnosis Line Normal sinus rhythm  Possible Left atrial enlargement  Borderline ECG    Confirmed by SEBASTIAN GONCALVES MD ([id number]) on 2020 5:10:16 AM (20 @ 17:39)    probnp 135  < from: Transthoracic Echocardiogram (20 @ 15:22) >  Summary:   1. Left ventricular ejection fraction, by visual estimation, is 55 to 60%.   2. Normal global left ventricular systolic function.   3. Spectral Doppler shows impaired relaxation pattern of left ventricular myocardial filling (Grade I diastolic dysfunction).   4. Structurally normal mitral valve, with normal leaflet excursion.   5. Trace mitral valve regurgitation.   6. Normal trileaflet aortic valve with normal opening.   7. Mildly dilated ascending aorta to 3.9cm.    < end of copied text >

## 2020-05-08 NOTE — PROGRESS NOTE ADULT - ASSESSMENT
48y/o M with HTN, asthma, known to be COVID+ve presents with signs of possible HF but neg BNP.    #MOSQUERA, LE swelling, SOB  - DDX: COVID PNA vs. CHF vs. less likely PNA vs. worsened by amlodipine?  - CXR b/l opacities R>L  - , Trop neg, not hypoxemic, no lymphopenia/transaminitis/JUAN  - no signs of sepsis or bacterial infection  - f/u repeat COVID19  - f/u procal, CRP, ferritin, D-dimer  - Duplex negative for DVT  - hold amlodipine  - c/w lasix 40 qd  - 2D echo follow up  - Cardio consult    #HTN- c/w HCTZ, hold amlodipine   #Congenital absence of kidney- use care w/ nephrotoxins  #pre-DM- diet controlled  #Asthma- c/w inhalers PRN & symbicort    #MISC  - DVT ppx: lovenox  - GI ppx: not indicated  - Activity: as tolerated  - Diet: DASH, CC

## 2020-05-09 VITALS
TEMPERATURE: 98 F | RESPIRATION RATE: 19 BRPM | SYSTOLIC BLOOD PRESSURE: 136 MMHG | HEART RATE: 80 BPM | DIASTOLIC BLOOD PRESSURE: 76 MMHG

## 2020-05-09 LAB
ALBUMIN SERPL ELPH-MCNC: 4.9 G/DL — SIGNIFICANT CHANGE UP (ref 3.5–5.2)
ALP SERPL-CCNC: 103 U/L — SIGNIFICANT CHANGE UP (ref 30–115)
ALT FLD-CCNC: 56 U/L — HIGH (ref 0–41)
ANION GAP SERPL CALC-SCNC: 11 MMOL/L — SIGNIFICANT CHANGE UP (ref 7–14)
AST SERPL-CCNC: 36 U/L — SIGNIFICANT CHANGE UP (ref 0–41)
BASOPHILS # BLD AUTO: 0.05 K/UL — SIGNIFICANT CHANGE UP (ref 0–0.2)
BASOPHILS NFR BLD AUTO: 0.5 % — SIGNIFICANT CHANGE UP (ref 0–1)
BILIRUB SERPL-MCNC: 0.7 MG/DL — SIGNIFICANT CHANGE UP (ref 0.2–1.2)
BUN SERPL-MCNC: 17 MG/DL — SIGNIFICANT CHANGE UP (ref 10–20)
CALCIUM SERPL-MCNC: 10.4 MG/DL — HIGH (ref 8.5–10.1)
CHLORIDE SERPL-SCNC: 96 MMOL/L — LOW (ref 98–110)
CO2 SERPL-SCNC: 30 MMOL/L — SIGNIFICANT CHANGE UP (ref 17–32)
CREAT SERPL-MCNC: 1 MG/DL — SIGNIFICANT CHANGE UP (ref 0.7–1.5)
EOSINOPHIL # BLD AUTO: 0.31 K/UL — SIGNIFICANT CHANGE UP (ref 0–0.7)
EOSINOPHIL NFR BLD AUTO: 3.3 % — SIGNIFICANT CHANGE UP (ref 0–8)
GLUCOSE SERPL-MCNC: 110 MG/DL — HIGH (ref 70–99)
HCT VFR BLD CALC: 49.5 % — SIGNIFICANT CHANGE UP (ref 42–52)
HGB BLD-MCNC: 16.7 G/DL — SIGNIFICANT CHANGE UP (ref 14–18)
IMM GRANULOCYTES NFR BLD AUTO: 0.4 % — HIGH (ref 0.1–0.3)
LYMPHOCYTES # BLD AUTO: 2.24 K/UL — SIGNIFICANT CHANGE UP (ref 1.2–3.4)
LYMPHOCYTES # BLD AUTO: 24.1 % — SIGNIFICANT CHANGE UP (ref 20.5–51.1)
MAGNESIUM SERPL-MCNC: 2.2 MG/DL — SIGNIFICANT CHANGE UP (ref 1.8–2.4)
MCHC RBC-ENTMCNC: 28.4 PG — SIGNIFICANT CHANGE UP (ref 27–31)
MCHC RBC-ENTMCNC: 33.7 G/DL — SIGNIFICANT CHANGE UP (ref 32–37)
MCV RBC AUTO: 84 FL — SIGNIFICANT CHANGE UP (ref 80–94)
MONOCYTES # BLD AUTO: 0.83 K/UL — HIGH (ref 0.1–0.6)
MONOCYTES NFR BLD AUTO: 8.9 % — SIGNIFICANT CHANGE UP (ref 1.7–9.3)
NEUTROPHILS # BLD AUTO: 5.83 K/UL — SIGNIFICANT CHANGE UP (ref 1.4–6.5)
NEUTROPHILS NFR BLD AUTO: 62.8 % — SIGNIFICANT CHANGE UP (ref 42.2–75.2)
NRBC # BLD: 0 /100 WBCS — SIGNIFICANT CHANGE UP (ref 0–0)
PLATELET # BLD AUTO: 318 K/UL — SIGNIFICANT CHANGE UP (ref 130–400)
POTASSIUM SERPL-MCNC: 4.1 MMOL/L — SIGNIFICANT CHANGE UP (ref 3.5–5)
POTASSIUM SERPL-SCNC: 4.1 MMOL/L — SIGNIFICANT CHANGE UP (ref 3.5–5)
PROT SERPL-MCNC: 8.6 G/DL — HIGH (ref 6–8)
RBC # BLD: 5.89 M/UL — SIGNIFICANT CHANGE UP (ref 4.7–6.1)
RBC # FLD: 13.8 % — SIGNIFICANT CHANGE UP (ref 11.5–14.5)
SODIUM SERPL-SCNC: 137 MMOL/L — SIGNIFICANT CHANGE UP (ref 135–146)
WBC # BLD: 9.3 K/UL — SIGNIFICANT CHANGE UP (ref 4.8–10.8)
WBC # FLD AUTO: 9.3 K/UL — SIGNIFICANT CHANGE UP (ref 4.8–10.8)

## 2020-05-09 PROCEDURE — 99239 HOSP IP/OBS DSCHRG MGMT >30: CPT

## 2020-05-09 RX ORDER — AMLODIPINE BESYLATE 2.5 MG/1
1 TABLET ORAL
Qty: 0 | Refills: 0 | DISCHARGE

## 2020-05-09 RX ORDER — FUROSEMIDE 40 MG
40 TABLET ORAL DAILY
Refills: 0 | Status: DISCONTINUED | OUTPATIENT
Start: 2020-05-10 | End: 2020-05-09

## 2020-05-09 RX ORDER — LISINOPRIL 2.5 MG/1
1 TABLET ORAL
Qty: 30 | Refills: 1
Start: 2020-05-09 | End: 2020-07-07

## 2020-05-09 RX ADMIN — Medication 40 MILLIGRAM(S): at 06:08

## 2020-05-09 RX ADMIN — Medication 25 MILLIGRAM(S): at 06:09

## 2020-05-09 RX ADMIN — ENOXAPARIN SODIUM 40 MILLIGRAM(S): 100 INJECTION SUBCUTANEOUS at 13:22

## 2020-05-09 RX ADMIN — Medication 5 MILLIGRAM(S): at 13:22

## 2020-05-09 NOTE — CONSULT NOTE ADULT - ASSESSMENT
presents   with le edema      scrotal edema      improved with  lasix  echo normal     follow office
48 y/o male with asthma, CHF recent COVID 19 infection admitted with sob    Shortness of breath  HfpeF decompensated-mildly  Abnormal CXR-likely secondary to Viral Pna  prob bnp 135  Asthma    monitor pulse ox  echo reviewed-diasolic dysxn  cont iv lasix,possible switch to po in 24 hours  maintain negative balnce  repeat cxr 24 hours  cont symbicort  ventolin prn  dvt/gi px  d/w Dr Cronin

## 2020-05-09 NOTE — DISCHARGE NOTE PROVIDER - HOSPITAL COURSE
48y/o M with PMH of HTN, pre-DM, asthma presents to ED for SOB, lower leg edema and scrotal swelling. Symptoms started 5 days ago, no chest pain or fever. He was tested positive for COVID-19 on 4/4/20. He was started on Amlodipine 5mg one month ago. He has mild SOB and orthopnea. In the ED . T98.5, HR 94, /102, RR18, SpO2 97% on RA. Labs all WNL, trop neg, . CXR b/l interstitial infiltrates R base >L. US testicles showed inc scrotal wall edema w/ R>L hydrocele. He was admitted to the hospital, COVID-19 test was negative, he was giving Lasix IV, echo showed normal LVEF, diastolic dysfunction, repeated CXR showed improved interstitial opacities. Patient feels better, leg edema improved. Amlodipine held.         A/P:     Lower leg edema, SOB and Scrotal edema:     Likely mild Acute HFpEF: possible fluid retention from Amlodipine.     Pro-BNP normal. Echo LVEF 55-60%, grade I diastolic dysfunction.     Venous Duplex negative for DVT.     S/P Lasix IV. Discharge on HCTZ 25mg daily. Add Lisinopril 10mg daily.             HTN    Continue HCTZ, add Lisinopril.        Congenital absence of kidney- use care w/ nephrotoxins    Pre-DM- diet controlled        Asthma- c/w inhalers PRN & symbicort

## 2020-05-09 NOTE — PROGRESS NOTE ADULT - SUBJECTIVE AND OBJECTIVE BOX
MATTMADHAVI MARTINS  49y, Male  Allergy: No Known Allergies      LAST 24-Hr EVENTS:  feels much better, no complaints  VITALS:  T(F): 97.3 (20 @ 05:09), Max: 97.7 (20 @ 19:55)  HR: 83 (20 @ 05:09)  BP: 146/93 (20 @ 05:09) (123/74 - 146/93)  RR: 19 (20 @ 05:09)  SpO2: 97% (20 @ 17:52)    PHYSICAL EXAM:    GENERAL: NAD, well-developed  HEAD:  Atraumatic, Normocephalic  NECK: Supple, No JVD  CHEST/LUNG: Clear to auscultation bilaterally; No wheeze  HEART: Regular rate and rhythm; No murmurs, rubs, or gallops  ABDOMEN: Soft, Nontender, Nondistended; Bowel sounds present  EXTREMITIES:  2+ Peripheral Pulses, No clubbing, cyanosis; mild dependent edema        TESTS & MEASUREMENTS:                          16.7   9.30  )-----------( 318      ( 09 May 2020 07:40 )             49.5           137  |  96<L>  |  17  ----------------------------<  110<H>  4.1   |  30  |  1.0    Ca    10.4<H>      09 May 2020 07:40  Mg     2.2         TPro  8.6<H>  /  Alb  4.9  /  TBili  0.7  /  DBili  x   /  AST  36  /  ALT  56<H>  /  AlkPhos  103      LIVER FUNCTIONS - ( 09 May 2020 07:40 )  Alb: 4.9 g/dL / Pro: 8.6 g/dL / ALK PHOS: 103 U/L / ALT: 56 U/L / AST: 36 U/L / GGT: x           CARDIAC MARKERS ( 08 May 2020 06:17 )  x     / <0.01 ng/mL / x     / x     / x      CARDIAC MARKERS ( 07 May 2020 18:22 )  x     / <0.01 ng/mL / x     / x     / x            Urinalysis Basic - ( 07 May 2020 20:04 )    Color: Light Yellow / Appearance: Clear / S.014 / pH: x  Gluc: x / Ketone: Negative  / Bili: Negative / Urobili: <2 mg/dL   Blood: x / Protein: 30 mg/dL / Nitrite: Negative   Leuk Esterase: Negative / RBC: 1 /HPF / WBC 0 /HPF   Sq Epi: x / Non Sq Epi: 0 /HPF / Bacteria: Negative        ABG & VENT SETTINGS: (when applicable)    n/a    RADIOLOGY & ADDITIONAL TESTS:  < from: Xray Chest 1 View- PORTABLE-Routine (20 @ 06:48) >  Impression:  Diminishing basilar opacities (likely accentuated by lordotic projection with earlier study)    Prominent pulmonary vascular markings with left midlung field reticular density consistent with scarring or discoid atelectasis.    No consolidation, effusion or pneumothorax.                  < end of copied text >    MEDICATIONS:  MEDICATIONS  (STANDING):  bisacodyl 5 milliGRAM(s) Oral daily  budesonide  80 MICROgram(s)/formoterol 4.5 MICROgram(s) Inhaler 2 Puff(s) Inhalation two times a day  chlorhexidine 4% Liquid 1 Application(s) Topical <User Schedule>  enoxaparin Injectable 40 milliGRAM(s) SubCutaneous daily  hydrochlorothiazide 25 milliGRAM(s) Oral daily    MEDICATIONS  (PRN):  acetaminophen   Tablet .. 650 milliGRAM(s) Oral every 6 hours PRN Temp greater or equal to 38C (100.4F)  ALBUTerol    90 MICROgram(s) HFA Inhaler 2 Puff(s) Inhalation every 6 hours PRN Shortness of Breath and/or Wheezing

## 2020-05-09 NOTE — CONSULT NOTE ADULT - SUBJECTIVE AND OBJECTIVE BOX
Patient is a 49y old  Male who presents with a chief complaint of SOB, MOSQUERA (09 May 2020 11:17)      HPI:  50y/o M with PMH of HTN, pre-DM, asthma presents to ED from Dr. Blake office w/ inc MOSQUERA and LE edema. Pt developed fever/cough/malaise 3/26, tested pos COVID / for 2wks after. Recovered by ~. Then 5d ago pt had inc scrotal swelling and inc leg swelling with some erythema. Had mild tenderness in R side. Today pt had inc MOSQUERA with mild SOB so called his cardiologist who sent him to ED. Otherwise denies CP, palpitations, fever/chills, cough, syncope, abd pain, NVD, urinary sxs, penile discharge/lesions. As per pt had echo/nuclear stress 3mo ago which were WNL.     In ED: T98.5, HR 94, /91, RR18, SpO2 97% on RA. Labs all WNL, trop neg, .   CXR b/l interstitial infiltrates R base >L. US testicles showed inc scrotal wall edema w/ R>L hydrocele.   Admit for cardio workup. COVID resent. 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 650 milliGRAM(s) Oral every 6 hours PRN Temp greater or equal to 38C (100.4F)  ALBUTerol    90 MICROgram(s) HFA Inhaler 2 Puff(s) Inhalation every 6 hours PRN Shortness of Breath and/or Wheezing      FAMILY HISTORY:  FH: CABG (coronary artery bypass surgery): father  at 66  Family history of cardiomyopathy: father  at 66      SOCIAL HISTORY:  CIGARETTES:    ALCOHOL:    REVIEW OF SYSTEMS:  CONSTITUTIONAL: No fever, weight loss, or fatigue  NECK: No pain or stiffness  RESPIRATORY: No cough, wheezing, chills or hemoptysis; No shortness of breath  CARDIOVASCULAR: No chest pain, palpitations, dizziness, or leg swelling  GASTROINTESTINAL: No abdominal or epigastric pain. No nausea, vomiting, or hematemesis; No diarrhea or constipation. No melena or hematochezia.  GENITOURINARY: No dysuria, frequency, hematuria, or incontinence  NEUROLOGICAL: No headaches, memory loss, loss of strength, numbness, or tremors  SKIN: No itching, burning, rashes, or lesions   ENDOCRINE: No heat or cold intolerance; No hair loss  MUSCULOSKELETAL: No joint pain or swelling; No muscle, back, or extremity pain  HEME/LYMPH: No easy bruising, or bleeding gums          Vital Signs Last 24 Hrs  T(C): 36.3 (09 May 2020 05:09), Max: 36.5 (08 May 2020 19:55)  T(F): 97.3 (09 May 2020 05:09), Max: 97.7 (08 May 2020 19:55)  HR: 83 (09 May 2020 05:09) (80 - 92)  BP: 146/93 (09 May 2020 05:09) (123/74 - 146/93)  BP(mean): --  RR: 19 (09 May 2020 05:09) (19 - 20)  SpO2: 97% (08 May 2020 17:52) (97% - 98%)        PHYSICAL EXAM:  GENERAL: NAD, well-groomed, well-developed  HEAD:  Atraumatic, Normocephalic  NECK: Supple, No JVD, Normal thyroid  NERVOUS SYSTEM:  Alert & Oriented X3, Good concentration  CHEST/LUNG: Clear to percussion bilaterally; No rales, rhonchi, wheezing, or rubs  HEART: Regular rate and rhythm; No murmurs, rubs, or gallops  ABDOMEN: Soft, Nontender, Nondistended; Bowel sounds present  EXTREMITIES:  2+ Peripheral Pulses, No clubbing, cyanosis, or edema  SKIN: No rashes or lesions    INTERPRETATION OF TELEMETRY:    ECG:    I&O's Detail      LABS:                        16.7   9.30  )-----------( 318      ( 09 May 2020 07:40 )             49.5     05-09    137  |  96<L>  |  17  ----------------------------<  110<H>  4.1   |  30  |  1.0    Ca    10.4<H>      09 May 2020 07:40  Mg     2.2         TPro  8.6<H>  /  Alb  4.9  /  TBili  0.7  /  DBili  x   /  AST  36  /  ALT  56<H>  /  AlkPhos  103      CARDIAC MARKERS ( 08 May 2020 06:17 )  x     / <0.01 ng/mL / x     / x     / x      CARDIAC MARKERS ( 07 May 2020 18:22 )  x     / <0.01 ng/mL / x     / x     / x            Urinalysis Basic - ( 07 May 2020 20:04 )    Color: Light Yellow / Appearance: Clear / S.014 / pH: x  Gluc: x / Ketone: Negative  / Bili: Negative / Urobili: <2 mg/dL   Blood: x / Protein: 30 mg/dL / Nitrite: Negative   Leuk Esterase: Negative / RBC: 1 /HPF / WBC 0 /HPF   Sq Epi: x / Non Sq Epi: 0 /HPF / Bacteria: Negative      I&O's Summary      RADIOLOGY & ADDITIONAL STUDIES:

## 2020-05-09 NOTE — DISCHARGE NOTE NURSING/CASE MANAGEMENT/SOCIAL WORK - PATIENT PORTAL LINK FT
You can access the FollowMyHealth Patient Portal offered by [facility] by registering at the following website: [email or web address]. By joining [facility]’s FollowMyHealth portal, you will also be able to view your health information using other applications (apps) compatible with our system.

## 2020-05-09 NOTE — DISCHARGE NOTE PROVIDER - NSDCMRMEDTOKEN_GEN_ALL_CORE_FT
hydroCHLOROthiazide 25 mg oral tablet: 1 tab(s) orally once a day  lisinopril 10 mg oral tablet: 1 tab(s) orally once a day

## 2020-05-09 NOTE — PROGRESS NOTE ADULT - ASSESSMENT
dyspnea, improved  acute on chronic diastolic heart failure, improved clinically and radiographically  recent COVID-19 pneumonia  asthma, stable        continue cardiac medications, blood pressure control, diuretic  outpatient cardiology follow up with dr boyce  pulmonary status stable for discharge home  continue symbicort as prescribed  outpatient pft's and follow up with dr skip manley [phone]

## 2020-05-09 NOTE — DISCHARGE NOTE PROVIDER - NSDCCPCAREPLAN_GEN_ALL_CORE_FT
PRINCIPAL DISCHARGE DIAGNOSIS  Diagnosis: Leg swelling  Assessment and Plan of Treatment: improved,      SECONDARY DISCHARGE DIAGNOSES  Diagnosis: Scrotal swelling  Assessment and Plan of Treatment:

## 2020-05-12 DIAGNOSIS — N50.89 OTHER SPECIFIED DISORDERS OF THE MALE GENITAL ORGANS: ICD-10-CM

## 2020-05-12 DIAGNOSIS — I11.0 HYPERTENSIVE HEART DISEASE WITH HEART FAILURE: ICD-10-CM

## 2020-05-12 DIAGNOSIS — R60.0 LOCALIZED EDEMA: ICD-10-CM

## 2020-05-12 DIAGNOSIS — I50.31 ACUTE DIASTOLIC (CONGESTIVE) HEART FAILURE: ICD-10-CM

## 2020-05-12 DIAGNOSIS — R73.03 PREDIABETES: ICD-10-CM

## 2020-05-12 DIAGNOSIS — N43.3 HYDROCELE, UNSPECIFIED: ICD-10-CM

## 2020-05-12 DIAGNOSIS — Q60.0 RENAL AGENESIS, UNILATERAL: ICD-10-CM

## 2020-05-12 DIAGNOSIS — Z82.49 FAMILY HISTORY OF ISCHEMIC HEART DISEASE AND OTHER DISEASES OF THE CIRCULATORY SYSTEM: ICD-10-CM

## 2020-05-12 DIAGNOSIS — J45.909 UNSPECIFIED ASTHMA, UNCOMPLICATED: ICD-10-CM

## 2020-05-12 DIAGNOSIS — Z86.19 PERSONAL HISTORY OF OTHER INFECTIOUS AND PARASITIC DISEASES: ICD-10-CM

## 2022-07-21 NOTE — H&P ADULT - NSHPROSALLOTHERNEGRD_GEN_ALL_CORE
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 Although every effort was made to ensure the accuracy of this automated transcription, some errors in transcription may have occurred. All other review of systems negative, except as noted in HPI

## 2023-09-20 NOTE — ED PROVIDER NOTE - DATA REVIEWED, MDM
Patient notified and verbalize understanding.     Order placed per MD v/o/AAH protocol   vital signs